# Patient Record
Sex: MALE | Race: WHITE | Employment: FULL TIME | URBAN - METROPOLITAN AREA
[De-identification: names, ages, dates, MRNs, and addresses within clinical notes are randomized per-mention and may not be internally consistent; named-entity substitution may affect disease eponyms.]

---

## 2018-08-30 ENCOUNTER — HOSPITAL ENCOUNTER (EMERGENCY)
Age: 48
Discharge: HOME | End: 2018-08-30
Attending: EMERGENCY MEDICINE

## 2018-08-30 VITALS
WEIGHT: 252.56 LBS | HEART RATE: 96 BPM | OXYGEN SATURATION: 96 % | TEMPERATURE: 98.3 F | BODY MASS INDEX: 38.28 KG/M2 | DIASTOLIC BLOOD PRESSURE: 110 MMHG | HEIGHT: 68 IN | SYSTOLIC BLOOD PRESSURE: 158 MMHG | RESPIRATION RATE: 20 BRPM

## 2018-08-30 DIAGNOSIS — M79.89 LEG SWELLING: ICD-10-CM

## 2018-08-30 DIAGNOSIS — R73.9 HYPERGLYCEMIA: ICD-10-CM

## 2018-08-30 DIAGNOSIS — L03.115 CELLULITIS OF RIGHT FOOT: Primary | ICD-10-CM

## 2018-08-30 LAB
ALBUMIN SERPL-MCNC: 3.5 G/DL (ref 3.5–5.2)
ALP BLD-CCNC: 65 U/L (ref 40–129)
ALT SERPL-CCNC: 9 U/L (ref 0–40)
ANION GAP SERPL CALCULATED.3IONS-SCNC: 12 MMOL/L (ref 7–16)
AST SERPL-CCNC: 12 U/L (ref 0–39)
BASOPHILS ABSOLUTE: 0.05 E9/L (ref 0–0.2)
BASOPHILS RELATIVE PERCENT: 0.5 % (ref 0–2)
BILIRUB SERPL-MCNC: 0.2 MG/DL (ref 0–1.2)
BUN BLDV-MCNC: 5 MG/DL (ref 6–20)
CALCIUM SERPL-MCNC: 8.4 MG/DL (ref 8.6–10.2)
CHLORIDE BLD-SCNC: 98 MMOL/L (ref 98–107)
CO2: 25 MMOL/L (ref 22–29)
CREAT SERPL-MCNC: 0.7 MG/DL (ref 0.7–1.2)
EOSINOPHILS ABSOLUTE: 0.2 E9/L (ref 0.05–0.5)
EOSINOPHILS RELATIVE PERCENT: 1.9 % (ref 0–6)
GFR AFRICAN AMERICAN: >60
GFR NON-AFRICAN AMERICAN: >60 ML/MIN/1.73
GLUCOSE BLD-MCNC: 210 MG/DL (ref 74–109)
HCT VFR BLD CALC: 43.6 % (ref 37–54)
HEMOGLOBIN: 14.8 G/DL (ref 12.5–16.5)
IMMATURE GRANULOCYTES #: 0.05 E9/L
IMMATURE GRANULOCYTES %: 0.5 % (ref 0–5)
LYMPHOCYTES ABSOLUTE: 2.99 E9/L (ref 1.5–4)
LYMPHOCYTES RELATIVE PERCENT: 28.7 % (ref 20–42)
MCH RBC QN AUTO: 28.8 PG (ref 26–35)
MCHC RBC AUTO-ENTMCNC: 33.9 % (ref 32–34.5)
MCV RBC AUTO: 84.8 FL (ref 80–99.9)
MONOCYTES ABSOLUTE: 0.64 E9/L (ref 0.1–0.95)
MONOCYTES RELATIVE PERCENT: 6.1 % (ref 2–12)
NEUTROPHILS ABSOLUTE: 6.48 E9/L (ref 1.8–7.3)
NEUTROPHILS RELATIVE PERCENT: 62.3 % (ref 43–80)
PDW BLD-RTO: 13.2 FL (ref 11.5–15)
PLATELET # BLD: 283 E9/L (ref 130–450)
PMV BLD AUTO: 9.6 FL (ref 7–12)
POTASSIUM SERPL-SCNC: 4 MMOL/L (ref 3.5–5)
RBC # BLD: 5.14 E12/L (ref 3.8–5.8)
SODIUM BLD-SCNC: 135 MMOL/L (ref 132–146)
TOTAL PROTEIN: 6.4 G/DL (ref 6.4–8.3)
WBC # BLD: 10.4 E9/L (ref 4.5–11.5)

## 2018-08-30 PROCEDURE — 80053 COMPREHEN METABOLIC PANEL: CPT

## 2018-08-30 PROCEDURE — 36415 COLL VENOUS BLD VENIPUNCTURE: CPT

## 2018-08-30 PROCEDURE — 85025 COMPLETE CBC W/AUTO DIFF WBC: CPT

## 2018-08-30 PROCEDURE — 99283 EMERGENCY DEPT VISIT LOW MDM: CPT

## 2018-08-30 RX ORDER — DOXYCYCLINE HYCLATE 100 MG
100 TABLET ORAL 2 TIMES DAILY
Qty: 20 TABLET | Refills: 0 | Status: SHIPPED | OUTPATIENT
Start: 2018-08-30 | End: 2018-09-09

## 2018-08-30 ASSESSMENT — ENCOUNTER SYMPTOMS
SORE THROAT: 0
ABDOMINAL PAIN: 0
SHORTNESS OF BREATH: 0
RHINORRHEA: 0
VOMITING: 0
COUGH: 0
CONSTIPATION: 0
SINUS PRESSURE: 0
PHOTOPHOBIA: 0
DIARRHEA: 0
SINUS PAIN: 0
NAUSEA: 0
WHEEZING: 0

## 2018-08-30 ASSESSMENT — PAIN DESCRIPTION - LOCATION: LOCATION: FOOT

## 2018-08-30 ASSESSMENT — PAIN DESCRIPTION - ORIENTATION: ORIENTATION: RIGHT;LEFT

## 2018-08-30 ASSESSMENT — PAIN DESCRIPTION - DESCRIPTORS: DESCRIPTORS: TIGHTNESS

## 2018-08-30 ASSESSMENT — PAIN SCALES - GENERAL: PAINLEVEL_OUTOF10: 7

## 2018-08-30 NOTE — ED PROVIDER NOTES
Patient is a 47-year-old male who presents with chief complaint of bilateral feet swelling. Patient states that 6 days ago he began to notice swelling of bilateral feet. As the week progressed he had worsening swelling of the right lower extremity that extends from his foot upward. Patient denies any recent trauma or falls. He denies any history of DVT/PE, recent travel, recent surgery, in mild distress, malignancy. 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 Patient denies any history of congestive heart failure, liver disease, or diabetes. Patient denies any fevers, chills, chest pain, shortness of breath, abdominal pain, nausea or vomiting. Treatment prior to arrival.      The history is provided by the patient. No  was used. Review of Systems   Constitutional: Negative for chills, fatigue and fever. HENT: Negative for congestion, rhinorrhea, sinus pain, sinus pressure, sneezing and sore throat. Eyes: Negative for photophobia and visual disturbance. Respiratory: Negative for cough, shortness of breath and wheezing. Cardiovascular: Positive for leg swelling. Negative for chest pain and palpitations. Gastrointestinal: Negative for abdominal pain, constipation, diarrhea, nausea and vomiting. Genitourinary: Negative for dysuria, frequency and hematuria. Musculoskeletal: Negative for neck pain and neck stiffness. Skin: Negative for rash and wound. Neurological: Negative for dizziness, light-headedness and headaches. Psychiatric/Behavioral: Negative for agitation, behavioral problems and confusion. Physical Exam   Constitutional: He is oriented to person, place, and time. He appears well-developed and well-nourished. No distress. HENT:   Head: Normocephalic and atraumatic. Mouth/Throat: Oropharynx is clear and moist. No oropharyngeal exudate. Eyes: Pupils are equal, round, and reactive to light. intertriginous area. No purulent drainage and no abscess. He has no tenderness to the foot otherwise and no plantar surface erythema or swelling. [NC]      ED Course User Index  [NC] Elizabeth Irizarry DO       --------------------------------------------- PAST HISTORY ---------------------------------------------  Past Medical History:  has a past medical history of Hyperlipidemia. Past Surgical History:  has a past surgical history that includes Cholecystectomy. Social History:  reports that he has been smoking Cigarettes. He has been smoking about 1.50 packs per day. He does not have any smokeless tobacco history on file. Family History: family history is not on file. The patients home medications have been reviewed. Allergies: Patient has no known allergies.     -------------------------------------------------- RESULTS -------------------------------------------------  Labs:  Results for orders placed or performed during the hospital encounter of 08/30/18   CBC Auto Differential   Result Value Ref Range    WBC 10.4 4.5 - 11.5 E9/L    RBC 5.14 3.80 - 5.80 E12/L    Hemoglobin 14.8 12.5 - 16.5 g/dL    Hematocrit 43.6 37.0 - 54.0 %    MCV 84.8 80.0 - 99.9 fL    MCH 28.8 26.0 - 35.0 pg    MCHC 33.9 32.0 - 34.5 %    RDW 13.2 11.5 - 15.0 fL    Platelets 630 755 - 345 E9/L    MPV 9.6 7.0 - 12.0 fL    Neutrophils % 62.3 43.0 - 80.0 %    Immature Granulocytes % 0.5 0.0 - 5.0 %    Lymphocytes % 28.7 20.0 - 42.0 %    Monocytes % 6.1 2.0 - 12.0 %    Eosinophils % 1.9 0.0 - 6.0 %    Basophils % 0.5 0.0 - 2.0 %    Neutrophils # 6.48 1.80 - 7.30 E9/L    Immature Granulocytes # 0.05 E9/L    Lymphocytes # 2.99 1.50 - 4.00 E9/L    Monocytes # 0.64 0.10 - 0.95 E9/L    Eosinophils # 0.20 0.05 - 0.50 E9/L    Basophils # 0.05 0.00 - 0.20 E9/L   Comprehensive Metabolic Panel   Result Value Ref Range    Sodium 135 132 - 146 mmol/L    Potassium 4.0 3.5 - 5.0 mmol/L    Chloride 98 98 - 107 mmol/L    CO2 25 22 - this time the patient is without objective evidence of an acute process requiring hospitalization or inpatient management. They have remained hemodynamically stable throughout their entire ED visit and are stable for discharge with outpatient follow-up. The plan has been discussed in detail and they are aware of the specific conditions for emergent return, as well as the importance of follow-up. New Prescriptions    DOXYCYCLINE HYCLATE (VIBRA-TABS) 100 MG TABLET    Take 1 tablet by mouth 2 times daily for 10 days       Diagnosis:  1. Cellulitis of right foot    2. Leg swelling    3. Hyperglycemia        Disposition:  Patient's disposition: Discharge to home  Patient's condition is stable.              Liz Monreal,   Resident  08/30/18 [id number]

## 2021-10-26 ENCOUNTER — APPOINTMENT (OUTPATIENT)
Dept: CT IMAGING | Age: 51
End: 2021-10-26

## 2021-10-26 ENCOUNTER — APPOINTMENT (OUTPATIENT)
Dept: GENERAL RADIOLOGY | Age: 51
End: 2021-10-26

## 2021-10-26 ENCOUNTER — HOSPITAL ENCOUNTER (EMERGENCY)
Age: 51
Discharge: HOME | End: 2021-10-26
Attending: EMERGENCY MEDICINE

## 2021-10-26 VITALS
WEIGHT: 235 LBS | OXYGEN SATURATION: 98 % | DIASTOLIC BLOOD PRESSURE: 92 MMHG | HEART RATE: 76 BPM | SYSTOLIC BLOOD PRESSURE: 137 MMHG | BODY MASS INDEX: 35.61 KG/M2 | RESPIRATION RATE: 18 BRPM | TEMPERATURE: 97.6 F | HEIGHT: 68 IN

## 2021-10-26 DIAGNOSIS — R07.9 CHEST PAIN, UNSPECIFIED TYPE: Primary | ICD-10-CM

## 2021-10-26 LAB
ANION GAP SERPL CALCULATED.3IONS-SCNC: 11 MMOL/L (ref 7–16)
BASOPHILS ABSOLUTE: 0.03 E9/L (ref 0–0.2)
BASOPHILS RELATIVE PERCENT: 0.7 % (ref 0–2)
BUN BLDV-MCNC: 9 MG/DL (ref 6–20)
CALCIUM SERPL-MCNC: 8.3 MG/DL (ref 8.6–10.2)
CHLORIDE BLD-SCNC: 98 MMOL/L (ref 98–107)
CO2: 23 MMOL/L (ref 22–29)
CREAT SERPL-MCNC: 0.7 MG/DL (ref 0.7–1.2)
D DIMER: 485 NG/ML DDU
EKG ATRIAL RATE: 87 BPM
EKG P AXIS: 24 DEGREES
EKG P-R INTERVAL: 116 MS
EKG Q-T INTERVAL: 364 MS
EKG QRS DURATION: 82 MS
EKG QTC CALCULATION (BAZETT): 438 MS
EKG R AXIS: 47 DEGREES
EKG T AXIS: 38 DEGREES
EKG VENTRICULAR RATE: 87 BPM
EOSINOPHILS ABSOLUTE: 0.02 E9/L (ref 0.05–0.5)
EOSINOPHILS RELATIVE PERCENT: 0.4 % (ref 0–6)
GFR AFRICAN AMERICAN: >60
GFR NON-AFRICAN AMERICAN: >60 ML/MIN/1.73
GLUCOSE BLD-MCNC: 230 MG/DL (ref 74–99)
HCT VFR BLD CALC: 47.1 % (ref 37–54)
HEMOGLOBIN: 15.6 G/DL (ref 12.5–16.5)
IMMATURE GRANULOCYTES #: 0.02 E9/L
IMMATURE GRANULOCYTES %: 0.4 % (ref 0–5)
INR BLD: 1
LACTIC ACID: 1.4 MMOL/L (ref 0.5–2.2)
LYMPHOCYTES ABSOLUTE: 1.42 E9/L (ref 1.5–4)
LYMPHOCYTES RELATIVE PERCENT: 31.8 % (ref 20–42)
MCH RBC QN AUTO: 29.1 PG (ref 26–35)
MCHC RBC AUTO-ENTMCNC: 33.1 % (ref 32–34.5)
MCV RBC AUTO: 87.7 FL (ref 80–99.9)
MONOCYTES ABSOLUTE: 0.74 E9/L (ref 0.1–0.95)
MONOCYTES RELATIVE PERCENT: 16.6 % (ref 2–12)
NEUTROPHILS ABSOLUTE: 2.24 E9/L (ref 1.8–7.3)
NEUTROPHILS RELATIVE PERCENT: 50.1 % (ref 43–80)
PDW BLD-RTO: 13.1 FL (ref 11.5–15)
PLATELET # BLD: 231 E9/L (ref 130–450)
PMV BLD AUTO: 10 FL (ref 7–12)
POTASSIUM REFLEX MAGNESIUM: 4 MMOL/L (ref 3.5–5)
PRO-BNP: 130 PG/ML (ref 0–125)
PROTHROMBIN TIME: 11.9 SEC (ref 9.3–12.4)
RBC # BLD: 5.37 E12/L (ref 3.8–5.8)
REASON FOR REJECTION: NORMAL
REJECTED TEST: NORMAL
SODIUM BLD-SCNC: 132 MMOL/L (ref 132–146)
TROPONIN, HIGH SENSITIVITY: 20 NG/L (ref 0–11)
TROPONIN, HIGH SENSITIVITY: 22 NG/L (ref 0–11)
WBC # BLD: 4.5 E9/L (ref 4.5–11.5)

## 2021-10-26 PROCEDURE — 2580000003 HC RX 258: Performed by: EMERGENCY MEDICINE

## 2021-10-26 PROCEDURE — 83880 ASSAY OF NATRIURETIC PEPTIDE: CPT

## 2021-10-26 PROCEDURE — 71045 X-RAY EXAM CHEST 1 VIEW: CPT

## 2021-10-26 PROCEDURE — 85610 PROTHROMBIN TIME: CPT

## 2021-10-26 PROCEDURE — 85378 FIBRIN DEGRADE SEMIQUANT: CPT

## 2021-10-26 PROCEDURE — 83605 ASSAY OF LACTIC ACID: CPT

## 2021-10-26 PROCEDURE — 36415 COLL VENOUS BLD VENIPUNCTURE: CPT

## 2021-10-26 PROCEDURE — 93005 ELECTROCARDIOGRAM TRACING: CPT | Performed by: EMERGENCY MEDICINE

## 2021-10-26 PROCEDURE — 71275 CT ANGIOGRAPHY CHEST: CPT

## 2021-10-26 PROCEDURE — 80048 BASIC METABOLIC PNL TOTAL CA: CPT

## 2021-10-26 PROCEDURE — 85025 COMPLETE CBC W/AUTO DIFF WBC: CPT

## 2021-10-26 PROCEDURE — 84484 ASSAY OF TROPONIN QUANT: CPT

## 2021-10-26 PROCEDURE — 6360000004 HC RX CONTRAST MEDICATION: Performed by: RADIOLOGY

## 2021-10-26 PROCEDURE — 99283 EMERGENCY DEPT VISIT LOW MDM: CPT

## 2021-10-26 RX ORDER — PREDNISONE 50 MG/1
50 TABLET ORAL DAILY
Qty: 5 TABLET | Refills: 0 | Status: SHIPPED | OUTPATIENT
Start: 2021-10-26 | End: 2021-10-31

## 2021-10-26 RX ORDER — AZITHROMYCIN 250 MG/1
250 TABLET, FILM COATED ORAL SEE ADMIN INSTRUCTIONS
Qty: 6 TABLET | Refills: 0 | Status: SHIPPED | OUTPATIENT
Start: 2021-10-26 | End: 2021-10-31

## 2021-10-26 RX ORDER — 0.9 % SODIUM CHLORIDE 0.9 %
1000 INTRAVENOUS SOLUTION INTRAVENOUS ONCE
Status: COMPLETED | OUTPATIENT
Start: 2021-10-26 | End: 2021-10-26

## 2021-10-26 RX ADMIN — SODIUM CHLORIDE 1000 ML: 9 INJECTION, SOLUTION INTRAVENOUS at 09:32

## 2021-10-26 RX ADMIN — IOPAMIDOL 75 ML: 755 INJECTION, SOLUTION INTRAVENOUS at 11:56

## 2021-10-26 ASSESSMENT — ENCOUNTER SYMPTOMS
WHEEZING: 0
SORE THROAT: 0
COUGH: 0
SHORTNESS OF BREATH: 0
ABDOMINAL PAIN: 0
EYE PAIN: 0
DIARRHEA: 0
NAUSEA: 0
EYE REDNESS: 0
SINUS PAIN: 0
BACK PAIN: 0
VOMITING: 0

## 2021-10-26 ASSESSMENT — PAIN DESCRIPTION - PAIN TYPE: TYPE: ACUTE PAIN

## 2021-10-26 ASSESSMENT — PAIN DESCRIPTION - LOCATION: LOCATION: CHEST

## 2021-10-26 ASSESSMENT — PAIN SCALES - GENERAL: PAINLEVEL_OUTOF10: 6

## 2021-10-26 NOTE — ED PROVIDER NOTES
Chief Complaint   Patient presents with    Chest Pain     left upper chest into left shoulder-onset last night       47-year-old male with unknown past medical history presenting today with chief complaint of chest pain. Yesterday morning he woke up feeling lightheaded and dizzy and went about his day. In the evening he started to experience shoulder and upper back cramping, worsened throughout the night and turned into chest pain this morning. The shoulder pain and back pain yesterday was not tearing, shearing, radiating. Chest pain is left-sided, he describes it as cramping, nothing is made it better, nothing makes it worse, no associated headache or vision changes. He is complaining of slight shortness of breath, no diaphoresis, nausea, vomiting, or diarrhea. He thinks his legs are minimally more swollen than normal.  He has not been to a doctor in several years and has never had a heart attack or stroke. He does smoke a pack a day. Review of Systems   Constitutional: Negative for chills and fever. HENT: Negative for ear pain, sinus pain and sore throat. Eyes: Negative for pain and redness. Respiratory: Negative for cough, shortness of breath and wheezing. Cardiovascular: Positive for chest pain. Gastrointestinal: Negative for abdominal pain, diarrhea, nausea and vomiting. Genitourinary: Negative for dysuria and flank pain. Musculoskeletal: Negative for back pain and neck pain. Skin: Negative for rash. Neurological: Negative for seizures and headaches. Hematological: Negative for adenopathy. All other systems reviewed and are negative. Physical Exam  Vitals and nursing note reviewed. Constitutional:       General: He is not in acute distress. Appearance: Normal appearance. He is not ill-appearing or toxic-appearing. HENT:      Head: Normocephalic and atraumatic.       Right Ear: External ear normal.      Left Ear: External ear normal.      Nose: Nose normal. Mouth/Throat:      Mouth: Mucous membranes are moist.      Pharynx: Oropharynx is clear. Eyes:      Conjunctiva/sclera: Conjunctivae normal.      Pupils: Pupils are equal, round, and reactive to light. Cardiovascular:      Rate and Rhythm: Normal rate and regular rhythm. Pulses: Normal pulses. Pulmonary:      Effort: No respiratory distress. Breath sounds: Normal breath sounds. No wheezing. Abdominal:      General: Abdomen is flat. Palpations: Abdomen is soft. Tenderness: There is no abdominal tenderness. Musculoskeletal:         General: No deformity or signs of injury. Cervical back: Neck supple. Skin:     General: Skin is warm and dry. Neurological:      General: No focal deficit present. Mental Status: He is alert. Mental status is at baseline. Procedures     EKG: This EKG is signed and interpreted by me. Rate: 87  Rhythm: Sinus rhythm  Interpretation: no ST elevations or other acute changes  Comparison: no previous EKG      MDM  Number of Diagnoses or Management Options  Chest pain, unspecified type  Diagnosis management comments: 49-year-old male with unknown past medical history presents today with chief complaint of chest pain. Is hemodynamically stable on arrival to the emergency department. EKG showed normal sinus rhythm without any ST elevations or other acute changes, troponin went from 22 down to 20. BNP was 130, BMP did not show any acute abnormalities, CBC showed no signs of leukocytosis or anemia, PT and INR were within normal limits as well as lactic acid. D-dimer was 485 and we obtain a CTA that was read as negative with no evidence of pulmonary emboli or acute aortic syndrome, there are areas of bronchial wall thickening and mild mosaic attenuation suggesting bronchitis mild mediastinal and hilar adenopathy, we discussed results with patient denies he is a chronic 1.5 pack a day smoker he verbalized understanding.   We did encourage him to establish with a primary care doctor to have more longitudinal care. Chest x-ray did not show any acute processes. With his worsening symptoms we did prescribe him prednisone and a Z-Vernon for discharge and discussed how to take them. He verbalized understanding and knows he can return to the emergency department if any of his symptoms acutely worsen. --------------------------------------------- PAST HISTORY ---------------------------------------------  Past Medical History:  has a past medical history of Hyperlipidemia. Past Surgical History:  has a past surgical history that includes Cholecystectomy. Social History:  reports that he has been smoking cigarettes. He has been smoking about 1.50 packs per day. He does not have any smokeless tobacco history on file. Family History: family history is not on file. The patients home medications have been reviewed. Allergies: Patient has no known allergies.     -------------------------------------------------- RESULTS -------------------------------------------------  Labs:  Results for orders placed or performed during the hospital encounter of 10/26/21   CBC Auto Differential   Result Value Ref Range    WBC 4.5 4.5 - 11.5 E9/L    RBC 5.37 3.80 - 5.80 E12/L    Hemoglobin 15.6 12.5 - 16.5 g/dL    Hematocrit 47.1 37.0 - 54.0 %    MCV 87.7 80.0 - 99.9 fL    MCH 29.1 26.0 - 35.0 pg    MCHC 33.1 32.0 - 34.5 %    RDW 13.1 11.5 - 15.0 fL    Platelets 266 418 - 027 E9/L    MPV 10.0 7.0 - 12.0 fL    Neutrophils % 50.1 43.0 - 80.0 %    Immature Granulocytes % 0.4 0.0 - 5.0 %    Lymphocytes % 31.8 20.0 - 42.0 %    Monocytes % 16.6 (H) 2.0 - 12.0 %    Eosinophils % 0.4 0.0 - 6.0 %    Basophils % 0.7 0.0 - 2.0 %    Neutrophils Absolute 2.24 1.80 - 7.30 E9/L    Immature Granulocytes # 0.02 E9/L    Lymphocytes Absolute 1.42 (L) 1.50 - 4.00 E9/L    Monocytes Absolute 0.74 0.10 - 0.95 E9/L    Eosinophils Absolute 0.02 (L) 0.05 - 0.50 E9/L immunocompromised patients and patients with   cancer. Follow up in patients with significant comorbidities as clinically   warranted. For lung cancer screening, adhere to Lung-RADS guidelines. Reference: Radiology. 2017; 284(1):228-43. XR CHEST PORTABLE   Final Result   No acute process. ------------------------- NURSING NOTES AND VITALS REVIEWED ---------------------------  Date / Time Roomed:  10/26/2021  8:45 AM  ED Bed Assignment:  14/14    The nursing notes within the ED encounter and vital signs as below have been reviewed. BP (!) 137/92   Pulse 76   Temp 97.6 °F (36.4 °C) (Tympanic)   Resp 18   Ht 5' 8\" (1.727 m)   Wt 235 lb (106.6 kg)   SpO2 98%   BMI 35.73 kg/m²   Oxygen Saturation Interpretation: Normal      ------------------------------------------ PROGRESS NOTES ------------------------------------------  I have spoken with the patient and discussed todays results, in addition to providing specific details for the plan of care and counseling regarding the diagnosis and prognosis. Their questions are answered at this time and they are agreeable with the plan. I discussed at length with them reasons for immediate return here for re evaluation. They will followup with primary care by calling their office tomorrow. --------------------------------- ADDITIONAL PROVIDER NOTES ---------------------------------  At this time the patient is without objective evidence of an acute process requiring hospitalization or inpatient management. They have remained hemodynamically stable throughout their entire ED visit and are stable for discharge with outpatient follow-up. The plan has been discussed in detail and they are aware of the specific conditions for emergent return, as well as the importance of follow-up.       Discharge Medication List as of 10/26/2021 12:54 PM      START taking these medications    Details   azithromycin (ZITHROMAX) 250 MG tablet Take 1 tablet by mouth See Admin Instructions for 5 days 500mg on day 1 followed by 250mg on days 2 - 5, Disp-6 tablet, R-0Print      predniSONE (DELTASONE) 50 MG tablet Take 1 tablet by mouth daily for 5 days, Disp-5 tablet, R-0Print             Diagnosis:  1. Chest pain, unspecified type        Disposition:  Patient's disposition: Discharge to home  Patient's condition is stable.            Nahum Pascual, DO  Resident  10/26/21 [id number]

## 2024-10-19 ENCOUNTER — HOSPITAL ENCOUNTER (EMERGENCY)
Age: 54
Discharge: HOME | End: 2024-10-19
Payer: OTHER MISCELLANEOUS

## 2024-10-19 ENCOUNTER — APPOINTMENT (OUTPATIENT)
Dept: GENERAL RADIOLOGY | Age: 54
End: 2024-10-19
Payer: OTHER MISCELLANEOUS

## 2024-10-19 VITALS
DIASTOLIC BLOOD PRESSURE: 89 MMHG | TEMPERATURE: 98 F | OXYGEN SATURATION: 95 % | HEART RATE: 100 BPM | SYSTOLIC BLOOD PRESSURE: 157 MMHG | RESPIRATION RATE: 20 BRPM

## 2024-10-19 DIAGNOSIS — M85.80 OSTEOPENIA, UNSPECIFIED LOCATION: ICD-10-CM
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
## 2024-10-19 DIAGNOSIS — S46.912A STRAIN OF LEFT SHOULDER, INITIAL ENCOUNTER: ICD-10-CM

## 2024-10-19 PROCEDURE — 73030 X-RAY EXAM OF SHOULDER: CPT

## 2024-10-19 PROCEDURE — 6370000000 HC RX 637 (ALT 250 FOR IP): Performed by: PHYSICIAN ASSISTANT

## 2024-10-19 PROCEDURE — 99283 EMERGENCY DEPT VISIT LOW MDM: CPT

## 2024-10-19 RX ORDER — CYCLOBENZAPRINE HCL 10 MG
10 TABLET ORAL 3 TIMES DAILY PRN
Qty: 9 TABLET | Refills: 0 | Status: SHIPPED | OUTPATIENT
Start: 2024-10-19 | End: 2024-10-22

## 2024-10-19 RX ORDER — NAPROXEN 500 MG/1
500 TABLET ORAL 2 TIMES DAILY WITH MEALS
Qty: 10 TABLET | Refills: 0 | Status: SHIPPED | OUTPATIENT
Start: 2024-10-19 | End: 2024-10-24

## 2024-10-19 RX ORDER — ACETAMINOPHEN 500 MG
1000 TABLET ORAL ONCE
Status: COMPLETED | OUTPATIENT
Start: 2024-10-19 | End: 2024-10-19

## 2024-10-19 RX ADMIN — ACETAMINOPHEN 1000 MG: 500 TABLET ORAL at 18:00

## 2024-10-19 ASSESSMENT — LIFESTYLE VARIABLES
HOW MANY STANDARD DRINKS CONTAINING ALCOHOL DO YOU HAVE ON A TYPICAL DAY: PATIENT DOES NOT DRINK
HOW OFTEN DO YOU HAVE A DRINK CONTAINING ALCOHOL: NEVER
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
